# Patient Record
(demographics unavailable — no encounter records)

---

## 2025-01-06 NOTE — PLAN
[FreeTextEntry3] :   1. Follow up is offered with Dr. 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 Soc/Emotional: __3/2024: -recc considering private therapy given some socialization weaknesses perhaps related to nervousness, and some easy frustration/big emotions  3. Discussed: -continued intensive school services and therapies __1/2025: -consider addition of text to talk or allowing typing if will assist in rate of her work output. Continued modified work and/or extended time for classwork/HW/testing needed  4.Medical: -Genetics: 1/2025 - possible etiology found, awaiting addtl testing results -Orthopedics: seen in the past. Also seen by [facility] -GI: seen in past for constipation -Endocrine: following - may retry Synthroid  5. Trances: -reviewed signs of possible sz, monitor  6. Neuropsychology: __3/2024 -consider evaluation given slow rate of work with school work and slow speech rate and additional challenges. Discussed IEE. Discussed evaluation may provide some additional information and reccs but unlikely to lead to major changes in services __1/2025: -consider testing to best understand her cognitive and learning profiles and to assist in implementing appropriate accommodations. Weaknesses on exec fxining noted on most recent testing  7: Autism concern: __1/2025- can pursue further testing (either ADOS2 or extended visit to review possible symptoms) if needed. Unlikely to significantly change services/interventions at this time but may in the future.  TO consider overtime if needed  It was a pleasure to work with Iza and her family today. Please do not hesitate to contact Dr. Streeter at [phone] with any other further questions or concerns.

## 2025-01-06 NOTE — REASON FOR VISIT
[FreeTextEntry1] :    I had the pleasure of meeting with Iza Abad, for a follow up appointment. Accompanied today by her mother.  Introduction:  Initial Consultation Assessment (2018): Iza Abad, age 2 years, 3 months, presented with concerns given her multiple developmental delays and a concern for autism.  Past testing (13-14 mo) and updated assessments (2018) have demonstrated a number of areas of delay, including motor, speech/language, and cognitive delays. A brief assessment today continues demonstrating significant language delays and some cognitive delays/problem solving skills delays.  Few red flags for autism have been identified and I conveyed this to Iza's mother. This can continue being monitored. Her presentation is currently consistent with a diagnosis of Global Developmental Delay and hypotonia.  Update 2022: -Recent triennial review indicates dx of GDD no longer appropriate. 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 Continued hypotonia.  INTERIM HISTORY:   ***Received  *~2024 Annual reviews reviewed and scanned into EMR/folder. In brief: -Academics on KTEA 3=average -improving FM skills -continued GM weaknesses -Low avg Speech abilities *Private SLT eval ( ) - continued weaknesses *IEP (2023) - multiple disabilities - robust services listed  *IEP (2024): -Classification: Multiple disabilities -ICT -PT -SLT -OT -Parent training -Counseling small group -BCBA consult -2:1 aide -wait time -modified classwork - reduce number of problems and Modified HW -ESY -Testing accommodations -allow breaks -modeling, visuals, seating, etc  *Psychological Eval (8.9 yo, 2024): -classroom teachers report she is kind, sweat, pleasure to have in class. Can be negative about her performance and may choose not to join in group activities during unstructured times -no clinically significant social, emotional, or behavior concerns at this time __Wechsler Intelligence Scales for Children - Fifth Edition (WISC-V) (SS): 	-VCI: 98 	-VSI: 92 	-FRI: 91 	-WMI:  79 	-PSI: 77 	-FSIQ: 86  *OT (): -continued OT services recommended  *SLT eval (2024): -Pragmatics: adequate EC and understanding of personal space during conversations, used appr greetings, initiated conversations often, maintained a conversation. Appr  turn taking during conversations. 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 __CELF-5 scores: 	-80s-90s, Core Lang 87 __Articulation weaknesses noted on testing  *PT review (2024): -continued delays  *Ed eval (10/2024): -scores low avg to avg  *Teacher intake form (2025): -ICT -weaknesses in multistep word problems, reading comp, gets overwhelmed when required to write -good attitude -may cry if overwhelmed and help isn't presented right away -Social: doesn't often seek out others, has begun participating in group activities, peers in class are kind to her, compliment her, but don't often seek her out. She has reported she likes/wants to play alone at recess -tries very hard, wants to do well -at times benefits from redirection and on task focusing prompts  -sometimes stops off in middle of sentences she is writing and stares off (NOTE- parent asked about this during visit and reports similar beh noted at home, immediately responsive however when called/touched and has been evaluated for seizures in the past) -Reading: slightly below GL in  b/c of comprehension. Very slow fluency rate which affects her comprehension. Good literal compr, more diff with beyond the text -Math: GL -Writign: on GL, very large and labored writing, very slow -Seatwork: due to her slow working paces often doesn't finish her work w/o modifications,/extra time,  work which is done is correct  __Child and Adolescent Symptom Inventory-5(ADRIANNA-5): (2025) Informant: Teacher  -Academics: [place] below GL, other areas on GL  -inattention: 2/9  -hyperactivity/impulsivity: 0/9 -oppositional and defiant behaviors: 0/8 -conduct disorder: not endorsed -anxiety symptoms: not endorsed -depressive symptoms: not endorsed -social deficits: not endorsed -language deficits: not endorsed -restricted and repetitive interests/behaviors: not endorsed  *** TODAY:  *Genetics (): -possible etiology found but unclear, addtl testing being sent and then to have follow up visit  Communication: -many words, some phrases -receptive: stronger than expressive -articulation weaknesses, perseveres if not understood (2021) -talking and communicating more __2022: -able to express herself well -at times articulation fair, and other things she says are more difficult to understand - better when try and thinks hard she is able to pronounce better __2022: -articulation improving, longer utterances __3/2024: -improved articulation, talking a lot more, knows what she wants to say and can say it - but takes longer for her to get the words out  Motor: -walks independently -doesn't need help ambulating -climbs, doesn't really run or jump, but walks faster -trips on things __2022: minimal change, more stable overall, working towards running, jumping is a challenge, needs railing for stairs, less tripping __2022: -getting faster, steadier, looking for less support as moving around/holding mother's hand less, starting to jump a little __2025; -continued OT and PT delays but making progress  Behavior: -generally calm and easy going -can be redirected if needed -fair redirection - - began finger licking behavior - unclear etiology -correlated a few wks after starting Synthroid - stopped a few days prior to 22 visit - seems to be diminishing. Seems more common in school than at home. -loud noise: sometimes sensitive, other times enjoys loud music -some staring __2025: -some increased frustration, quick to frustration, big emotions - more of an issue at home than in school -School: generally doing well behaviorally in school  Social: -enjoys being around peers, has some preferred peers in school -laughs with some peers, wants to sit near one particular peer __2022: doing ok, more solitary play, more playing near peers and not with them, tries to interact with peers at times -family friends - played very nicely, but more independent __11/2022: -shy, slow to warm up, in school if sought out engages, but generally otherwise not making social bids. At home will eventually join peers -some imaginative/pretend play w/ peers __3/2024: -struggles socially in all types of settings - will work in counseling in school on socialization goals as well -may pull back from group - even if approached - think that in part b/c may be more physically limited more than peers -in smaller groups/non-physical play - variable success - sometimes does very well, other times easily upset and hard time participated -has friends, seems received well by peers __2025: -has some friends outside of school she plays with and enjoys spending time with -less socially connected in school  Trances: -mother reports no difft from the past - in past evaluated by Neuro and nothing found -moments were staring off but response to prompts, no loss in tone __3/2024: -less than the past __2025: -some staring off - redirectable   Finger licking: __3/2024: -decreased freq/stopped - was also evaluated by Neurology __2025: -more likely to occur during more stressful/exciting situations  Autism concern: __2025: -Neurologist in past reportedly noted perhaps mild Autism -some visual inspection and perhaps brief flapping noted during one past visit - mother reports in general haven't noticed -some holding of hands today and hands held in triangle type shape - mother reports in general haven't noticed  Private Services: -Private OT - stopped -private SLT -[facility]  SCHOOL HISTORY/DEVELOPMENTAL SERVICES: Academics: -doing well -likes math, sounding out words with assistance, keeping up with sight words __2022: average academically __3/2024: doing well __2025:  -report card: 2s and 3s. Somewhat behind in Reading, weaker with inferences, slow fluency rate  School: [employer] Grade:2nd Services: -ICT -SLT -OT -PT -2:1  -Counseling --small group working on processing/identifying emotions -BCBA consult -ESY   Past School: School Name: [employer] Grade:  Services: -full day -SLT X 4 -PT X 3 -OT X 2 -12:1:1 program -5 days in person -1:1 aide since unsteady on her feet -ESY   *Teacher intake form (2022): -K -Gen Ed, SLT, OT, PT, APE, 1:1 aide, Resource rm daily, ?DC 2 X / day,  pushes in 2 periods a day -Strengths: math, reading -weaknesses: Speech/lang, FM, GM -sweet, follows direction and tries her best to complete tasks -tires easily during writing -social: loved by peers, interacts w/ peers during recess, can be shy -Academics: Average - with weaknesses in handwriting, lang, and PE -Reading- Oral: speech very diff to understand -usually one word response -Seatwork: takes much longer to complete tasks than peers   *Teacher intake form (10/2022): -1st grade, ICT, [place] -SLT, OT, PT, Direct consult with , Resource rm -very friendly, rule follower, participates -works at extremely slow pace - each movement takes a lot of effort, sensitive to noise, can be overly sensitive and cry when unable to keep pace with peers -tries her best to stay on task and complete all work - but work needs to be modified/shortened for her to complete - modification cause her to become visibly upset b/c she wants to complete all work -Social: seems to enjoy her peers but doesn't seek out social interaction -good sense of humor -Academics: average -Seatwork: good effort but rate of completion is slow -soft spoken, enjoys participating, best effort   *Teacher comments (~2021): -Good sense of humor, very independent, enjoys [place] time, happy disponsition when engaged w/ her peers -follows routine directives -often uses pointing/gestures, eye gaze to express her wants/needs -often will not engage in conversations w/ her peers or teachers - needs facilitation to use her lang - responds in 1-2 word respondse but will not engage in reciprocal conversations -Academics: strong in ability to recognize letters, shapes, colors, numbers - participates answering familiar WH questions -good focus during structured/unstructured times -easily frustrated -picks her lips -continued motor issues, unsteady on her feet, falls, exhibits oral, fine, and gross motor apraxia   CURRENT FUNCTIONING/HPI:  ******AS noted during her initial evaluation:  *EI (14 months, 2017-): -SI: 2 X 45min -PT: 2 X 45min -SLP: 2 X 45min  No period of regression. Some periods of staring off but mother is able to get her attention by making noise and other methods.  Language/Communication: -no words -follows instruction with a gesture/cue -looks up when her name is called -demonstrates showing/shared enjoyment -Signs: more, give to me, open -points with associated eye contact -minimal babbling -social smile -Shakes head for 'No,' claps for 'Yes' -mimics some animal noises -making more noises now  Emotional: -generally happy  Social/Play Skills: -becomes excited around other children, wants to observe them and be where they are, is involved in the toys they are playing with -likes coloring, emptying/filling, Mr. Potato Head, figurines, pretends cutting fruit, pretends drinking from pretend cup, working on pretend play with therapist  Atypical Behaviors/Sensory: -no atypical behaviors (flapping, spinning, wheel focus, finger flicking, unusual visual regard, lining up) -no sensory issues  Motor Abilities: -Pulls to stand -Cruises -takes steps with parents holding hands -demonstrates pincer grasp -cant drink from straw, holds sippy cup independently and holds it up to drink -gets to sitting from lying down  Early Developmental Milestones: -multiple developmental delays   *********  *MEDICAL HISTORY:  Current Medications: -MVI  Allergies: -None  UPDATED PAST MEDICAL HISTORY: There have been no recent major medical changes.  Birth History: -born FT, 8/13lbs, vaginal delivery, no complications  ROS: A 10-point review of systems was performed. No concerns regarding vision and hearing. No cardiovascular, respiratory, gastrointestinal, renal, endocrine, neurologic, musculoskeletal, or dermatologic concerns.  *Orthopedics: -seen in past for clavicular fracture -needed cast for leg injury after falling -2021: parent report at last meeting with Ortho no addtl recommendations regarding supports/braces/etc to assist with falls/motor delays  *PMR (): -continue PT  *Genetics ( ): -no obvious etiology for her developmental delay. 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 Mckeon, 2017 and associates 2018): -EEG: abnormal EEG, moderate diffuse cerebral dysfunction - noted that spoke with mother and generalized slowing, no spikes/seizures -noted hypotonia, frontal bossing, large anterior fontanelle, normal reflexes, relatively good strength -suggests hypotonia is central -Brain and Spine MRI normal -referred to DBP and Genetics   Audiology (around 18mo): Normal  Vision: No concerns  Hospitalizations/ Surgeries: -None  FAMILY HISTORY: Her father is a . No school issues. Isn't always home daily. No medical issues. Her mother is a former teacher - , general ed.. No school issues. No medical issues. Her brother - Patrick, is a patient of mine.  There is an extended family history of: -MS: paternal aunt -ADHD: paternal aunt -Sensory processing issue: 1st cousin -Autism: 1st cousin  There is no extended family history of mental health issues or developmental issues (such as depression, anxiety, OCD, schizophrenia, bipolar disorder, intellectual disability learning disabilities) There is no history of congenital heart issues, heart rhythm problems, or of sudden death.  SOCIAL HISTORY: -lives with her parents and sister.  No recent changes or stressors at home.  *PHYSICAL EXAMINATION ( 18):  Constitutional: Well appearing. No acute distress. Dysmorphic Features: large open anterior fontanelle and small open posterior fontanelle Eyes: Extraocular movements grossly intact. Ear, Nose, Mouth, Throat: Moist mucous membranes. Cardiovascular: S1,S2. Regular rate and rhythm. Respiratory: Clear to auscultation bilaterally. Gastrointestinal: Soft, non-tender, non-distended. No hepatosplenomegaly. Normoactive bowel sounds. Musculoskeletal: Decreased tone. Motor: sat with good control on her mother's lap, sherri circles, manipulated blocks  Observations (18): -demonstrated shared enjoyment, showing, social smile -sat with good control on her mother's lap -used signs to communicate -no stereotyped behaviors noted (18): -brief flapping when upset -brief finger flicking when upset -frequent joint attention -when we didn't clap when she completed a task she clapped and looked for our clapping to come -socially engaged (19): -socially engaging -slow walk holding parent finger, outturning feet (Tele 2021): -fair eye contact -demonstrate joint attention -friendly -articulation weaknesses -spoke mostly in one word segments but some phrases/short sentences -some posturing of hand -calm throughout visit -demonstrated shared enjoyment (Tele 22): -unable to jump and demonstrated a very slow and somewhat awkward run -some of her speech was very fluent and clear (ABCs, counting 1-10), much of the rest of her speech was difficult to understand. Told me her age and teacher's name. Spoke in few word utterances -friendly, demonstrated some pretend play - pretend hunter santiago was a cell phone and held it to her ear pretended she was typing in it (22 Tele): -friendly, cooperative, interactive -more clear articulation compared to past, spoke in sentences/multi-word utterances -slow rate of speech -answered questions -good EC (3/2024): -participated -jumped decently -briefly had a small piece of paper, folded into loop and inspected it and briefly flicked it/waved it around and brief flap __2025: -fair EC and social engagement -answered posed questions, some weaknesses in reciprocal conversation -held hands one in the other or in triangle/pyramid shape -able to tell me about book she was reading -able to understand most of what she said  DEVELOPMENTAL ASSESSMENTS:  PREVIOUS ASSESSMENTS/SERVICES:  *Educational Evaluation (13 mo, 2017): -Developmental Assessment of Young Children-2 (DAYC-2) (SS)(SD=15): -Cognitive: 83 -Recp Lan -Expr Lan -Total Lan -Soc/Emot: 86 -Adaptive: 71 -Gross Motor: 64 -Fine Motor: 92 -Total Motor: 79 - Evaluation Scale (PES) (SS, mean of 10 and SD of 3): -Gross Motor: 3 -Fine Motor: 8 -Cognitive: 5 -Language: 5 -Soc/Emot: 5 -Self-Help/Adaptive: 2  *SLP Eval (14 mo): -severe overall language delay -- Language Scale-5 (SS)(SD of 15): -Auditory Comp: 73 -Expressive Comm: 66 -Total Language: 68 -Receptive Expressive Emergent Language Test-3rd Edition (REEL-3): -Receptive Lan -Expressive Lan -Language Ability Score: 53  *PT Evaluation (13 mo): -PDMS-2: GMQ = 68  *19 month old Progress Report (2017) (test name not identified): -Motor Domain: 72 -Cognitive Domain: 75 -Communication Domain: 68 -Social/Emotional Domain: 85 -Adaptive Development: 73  *25 month Progress Report (2018): -making slow, steady progress - Evaluation Scale (PES) (SS, mean of 10 and SD of 3): -Gross Motor: 1 -Fine Motor: 9 -Cognitive: 4 -Language: 1 -Soc/Emot: 4 -Self-Help/Adaptive: 4 (2018): -PDMS -2: GMQ = 59   *Capute Scales-CAT/CLAMS (Cognitive Adaptive Test /Clinical Linguistic and Auditory Milestone Scale)(administered by Dr. Streeter, 18):  This scale was administered during our session today by Dr Streeter. The Capute Scales (CAT/CLAMS) is a pediatric assessment tool evaluating language development/communication skills and problem solving (cognitive/adaptive/visual motor) skills. Results are as follows:  Chronological Age: 27 months  CLAMS: age equivalent: 10.3mo Developmental Quotient: 38  CAT: age equivalent: 20.9mo Developmental Quotient: 77  Full Scale Developmental Quotient: No full scale quotient given as individual scores are disparate  Observations: Generally cooperative  Interpretation: [place]'s language/communication skills are falling at the 10 month level and problem solving skills are falling at the 20.9 month level.  ***Triennial Review (~2021):  *SLT jerri (5.8 yr, 10/2021, in person jerri): -appr EC, adequate pragmatic lang skills. Doesn't typically initiate conversations and has diff maintaining a conversation over 2-3 exchanges -language delays -average receptive/expressive vocab. Skills on EOWPVT4 and ROWPVT4 -articulation errors -presents w/ motor planning difficulties affecting her speech production, oral motor skills, rate of speech, prosody, and intelligibility __CELF-5 (SS): -Core Language: 78 -Recp Lan -Expr Lan -Lang Content: 88 -Lang Structure: 78 __Expressive One Word Picture Vocabulary Test-4 (EOWPVT-4) (SS):102 __Receptive One Word Picture Vocabulary Test-4 (ROWPVT-4) (SS): 94 __Goldman Fristoe Test of Articulation 3: -Sounds in Words: 40 __Blake Barr Phonological Analysis -3 (SS): -Score: 40   *OT Evaluation report (2021): -pleasant, cooperative, actively engages in activities -fatigues easily, requires many breaks during physically demanding tasks -shy, prefers not to participate in whole class settings -Continued FM weaknesses, VM weaknesses, ocular motor weaknesses __Bruininks - Oseretsky Test of Motor Proficiency ( Bot-2) (SS - mean = 50): -VMI: 78 -Visual Perception: 96 -Motor Coordination: 86  *Psychological Reevaluation (2021, 5.8 yr): -walking was slow, didn't engage in spontaneous conversation but responded to examiner's questions -speech often diff to understand and she responded in a low tone of voice, at times expressed herself with thumbs up/down -worked slowly on all tasks which incorporated a motor component -cooperative -testing deemed a fair estimate of her cogn abilities -sweet and loving, can be quiet in the classroom and prefer to play by herself -tends to tire easily and is working on her stamina -enjoys listening to the teacher and her friends -__ Wechsler  and Primary Scale of Intelligence-4th Ed (WPPSI-IV) (SS): -Verbal Comprehension Index (VCI): 88 -Visual Spatial Index (VSI): 91 -Fluid Reasoning (FR): 97 -Working Memory Index (WMI): 84 -Processing Speed (PS): 79 -Full Scale IQ: 83  *Educational Eval (10/2021, 5.7 y): __Wechsler Individual Achievement Test-3rd Edition (WIAT-III) (SD = 15): -Early Reading Skills: 94 -Math Problem Solvin -Numerical Operations: 86 -Spellin  *PT Reevaluation (2021): -below avg GM developmental, decreased core and extremity strength/endurance, poor to fair static/dynamic balance and decreased upper/lower extr coordination -low muscle tone in extremities and lower tone in her core -poor spatial awareness - impacts her ability to safely negotiate the school environment __Bruininks - Oseretsky Test of Motor Proficiency ( Bot-2) (SS - mean = 50): -Body Coordination: 12 - 2% -Strength and Agility: 12 - 2 % ***  (2022): __Early Childhood Inventory-5 (ECI-5): Informant: Teacher -inattention: 0 /9 -hyperactivity/impulsivity: 0/9 -oppositional and defiant behaviors: 0 /8 -conduct disorder: not endorsed -anxiety symptoms: not endorsed -depressive symptoms: not endorsed -social deficits: not endorsed -language deficits: some endorsed -restricted and repetitive interests/behaviors: one endorsed No significant symptoms of any emotional or behavioral disorder  (10/2022): __Early Childhood Inventory-5 (ECI-5): 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 - Gen  -Developmental Status: Average = lang comp, other areas = below avg/delayed -inattention: 0 /9 -hyperactivity/impulsivity: 0 /9 -oppositional and defiant behaviors: 0 /8 -conduct disorder: not endorsed -anxiety symptoms: not endorsed -depressive symptoms: not endorsed -social deficits: minimally endorsed -language deficits: some endorsed -restricted and repetitive interests/behaviors: minimally endorsed Select endorsed as 'often/very often:' -low energy : 2 -doesn't relate/play well with other children: 2 -not interested in making friends: 2 -significant probl with lang devp: 3 -overly sensitive to soundsA.: 3 Comments: -finger licks -at times needs to be verbally prompted back to task b/c it appears she was staring off in a trance like state -very sweet/kind, doesn't seem interested in seeking out friendships with her peers -interacts appropriately if they seek her out first -tires very easily and her pace is extremely slow to perform tasks.